# Patient Record
(demographics unavailable — no encounter records)

---

## 2025-03-19 NOTE — DISCUSSION/SUMMARY
[FreeTextEntry1] : sent to [facility] for evaluation parents going home first  short time then will go to ER parent request goint to [facility] call to ER to notify sending to ER for serum total and direct bilirubin due to phototherapy transcutaneous , bilirubin not done in office discussed with parents, weight loss, cardiac murmur and increased crying lost 10 percent of weight loss, history Omar positive ABO incompatibility , s/p phototherapy at 17 hours old Parents have observed crying at home since last note frequently and at times fast breathing on exam has cardiac murmur call back by office to check status parents given copy of  nursery notes and note written for today's visit to bring to hospital ER

## 2025-03-19 NOTE — PHYSICAL EXAM
[TextEntry] : General Appearance:  Awake,  Alert  In no acute distress baby is crying in office, I am able to console, crying with parents active Head:  Appearance:  Anterior fontanelle open and flat Neck:  supple. jaundice to thigs  erythema toxicum. congenital dermal melanocytosis sclera icteric Ears: bilateral  Tympanic Membrane:  Pearly with light reflex bilaterally. Pharynx:Normal findings  non erythematous pharynx Lungs:  Clear to auscultation. Cardiovascular: Heart Rate And Rhythm:  Regular. Heart Sounds:  Normal. Murmurs:  2/6 neyda Abdomen: Palpation:  Soft.  Liver:  Not enlarged. Spleen:  Not enlarged. Cord attached no redness  Genitalia: Abundio 1 testes descended bilateral , no herniacircumcised healing  Musculoskeletal System: General/bilateral:  Normal movement of all extremities.   Normal spine and back.  Normal spine and back. Hips: General/bilateral:  An Ortolani test of the hips was negative.   Hercules's test of the hips was negative Neurological:Motor:  Normal muscle tone.

## 2025-03-19 NOTE — DISCUSSION/SUMMARY
[FreeTextEntry1] : sent to [facility] for evaluation parents going home first  short time then will go to ER parent request goint to [facility] call to ER to notify sending to ER for serum total and direct bilirubin due to phototherapy transcutaneous , bilirubin not done in office discussed with parents, weight loss, cardiac murmur and increased crying lost 10 percent of weight loss, history Omar positive ABO incompatibility , s/p phototherapy at 17 hours old Parents have observed crying at home since last note frequently and at times fast breathing on exam has cardiac murmur call back by office to check status parents given copy of  nursery notes and note written for today's visit to bring to hospital ER

## 2025-03-19 NOTE — HISTORY OF PRESENT ILLNESS
[Born at ___ Wks Gestation] : The patient was born at [unfilled] weeks gestation [] : via normal spontaneous vaginal delivery [[place]] : [facility] [BW: _____] : weight of [unfilled] [Length: _____] : length of [unfilled] [HC: _____] : head circumference of [unfilled] [Hepatitis B Vaccine Given] : Hepatitis B vaccine given [DW: _____] : Discharge weight was [unfilled] [Nirsevimab Given] : Nirsevimab not given [NO] : No [FreeTextEntry1] : MUKESH  is here for  well visit 4 days old, parents concerned as crying since last night and looks yellow jaundice history of phototherapy in  nursery   Nutrition:BF, milk in per  mom Elimination: 3 wet diapers in one day, bm 1 to 2 dark meconium Immunizations: Up to date.   Parent(s) have current concerns or issues. crying since last night looks more yellow, history phototherapy reviewed note 17 h DOL, Coomb's positive red spot palate noticed with crying fast breathing intermittent per dad rash on body  FROM EMR  NURSERY Male infant born at 39+6 weeks to a 32 year old  mother via Vaginal delivery. Maternal history Anemia. Pregnancy course Oligohydramnios. Maternal blood type O+. GBS negative, HBsAg negative, HIV negative; treponema non-reactive & Rubella immune. Baby  B positive Delivery uncomplicated. APGAR 9 & 9 at 1 & 5 minutes respectively. Birth weight 3650 g. Erythromycin eye drops and vitamin K given. Infant blood type B+, Stone positive. EOS score 0.11.  Hospital course was remarkable for hyperbilirubinemia from ABO incompatibility treated with phototherapy. at 17 hours old bilirubin 7.8.  G6PD no deficiency    discharge Weight (ounces): 7 lb 10.929 % weight change = -4.52%   Discharge Laboratory Results Serum bilirubin: 9.1mg/dl at 43 hours of life (phototherapy threshold of 13.3) passed hearing passed [facility]

## 2025-03-19 NOTE — HISTORY OF PRESENT ILLNESS
[Born at ___ Wks Gestation] : The patient was born at [unfilled] weeks gestation [] : via normal spontaneous vaginal delivery [[place]] : [facility] [BW: _____] : weight of [unfilled] [Length: _____] : length of [unfilled] [HC: _____] : head circumference of [unfilled] [Hepatitis B Vaccine Given] : Hepatitis B vaccine given [DW: _____] : Discharge weight was [unfilled] [Nirsevimab Given] : Nirsevimab not given [NO] : No [FreeTextEntry1] : MUKESH  is here for  well visit 4 days old, parents concerned as crying since last night and looks yellow jaundice history of phototherapy in  nursery   Nutrition:BF, milk in per  mom Elimination: 3 wet diapers in one day, bm 1 to 2 dark meconium Immunizations: Up to date.   Parent(s) have current concerns or issues. crying since last night looks more yellow, history phototherapy reviewed note 17 h DOL, Coomb's positive red spot palate noticed with crying fast breathing intermittent per dad rash on body  FROM EMR  NURSERY Male infant born at 39+6 weeks to a 32 year old  mother via Vaginal delivery. Maternal history Anemia. Pregnancy course Oligohydramnios. Maternal blood type O+. GBS negative, HBsAg negative, HIV negative; treponema non-reactive & Rubella immune. Baby  B positive Delivery uncomplicated. APGAR 9 & 9 at 1 & 5 minutes respectively. Birth weight 3650 g. Erythromycin eye drops and vitamin K given. Infant blood type B+, Stone positive. EOS score 0.11.  Hospital course was remarkable for hyperbilirubinemia from ABO incompatibility treated with phototherapy. at 17 hours old bilirubin 7.8.  G6PD no deficiency    discharge Weight (ounces): 7 lb 10.929 % weight change = -4.52%   Discharge Laboratory Results Serum bilirubin: 9.1mg/dl at 43 hours of life (phototherapy threshold of 13.3) passed hearing passed [facility]

## 2025-03-25 NOTE — HISTORY OF PRESENT ILLNESS
[de-identified] : [facility] follow up  [FreeTextEntry6] : MUKESH is  11 days old  FT, here today for follow up [facility] hospitalization 3/18 to 3/21/25 for jaundice, Stone positive ABO incompatibility, weight loss 10% birth weight,  VSD , history of hypothermia  during admission doing well since discharge temperature has been stable Nutrition: breast feeding every 2 to 3 hours and Similac formula about 4 to 5 oz per day Elimination: Normal urination at least 6 per day and bowel movements 4 to 5 /day not meconium   Patient is doing well at home. Feeding well no difficulty feeding, no fast breathing   History seen at  4 days old at our office with crying since the night prior to visit, jaundice, abo incompatibility, Stone positive, meconium stools, breast feeding and lost 10% birth weight loss. History significant for phototherapy at 17 hours old in nursery and discharged home. On exam had cardiac murmur not present in nursery. cord G6PD no deficiency . Sent to [facility] for evaluation. Patient was admitted Birth History:   Delivery: The patient was born at 40 weeks gestation, via normal spontaneous vaginal delivery [facility]. Birth measurements were weight of 8LB 0.79 oz, length of 20.5IN and head circumference of 14.3IN . Discharge weight was 7LB 10.9 oz. Male infant born at 39+6 weeks to a 32 year old  mother via Vaginal delivery. Maternal history Anemia. Pregnancy course Oligohydramnios. Maternal blood type O+. GBS negative, HBsAg negative, HIV negative; treponema non-reactive & Rubella immune. Baby B positive Delivery uncomplicated. APGAR 9 & 9 at 1 & 5 minutes respectively. Birth weight 3650 g. Erythromycin eye drops and vitamin K given. Infant blood type B+, Stone positive. EOS score 0.11.  Hospital course was remarkable for hyperbilirubinemia from ABO incompatibility treated with phototherapy. at 17 hours old    On admission bilirubin was 19.8/direct 0.8, sodium 152, given bolus. discharge bilirubin 3/21/2025 was 12.6 direct 0.2. History lactation consultation, started on vitamin d and formula supplementation. On HD2 baby was hypothermic 35.6 rule out sepsis work was done. given empiric amp and gentamycin for 48 hours. RVP PCR negative cbc, crp procalcitonin done, cxr was negative. cardiac evaluated echo small to moderate sized donna membranous VSD with partly restrictive left to right flow, mildly elevated RV pressure and PFO

## 2025-03-25 NOTE — PHYSICAL EXAM
[TextEntry] :  General Appearance:  Awake,  Alert  In no acute distress well appearing good cry alert, consoles in office with parents Head:  Appearance:  Anterior fontanelle open and flat Neck:  supple. jaundice much improved from last visit s/p phototherapy Ears: bilateral  Tympanic Membrane:  Pearly with light reflex bilaterally. Pharynx:Normal findings  non erythematous pharynx Lungs:  Clear to auscultation. Cardiovascular: Heart Rate And Rhythm:  Regular. Heart Sounds:  Normal. Murmurs:  2/6 loud LSB murmurs were heard. Abdomen: Palpation:  Soft.  Liver:  Not enlarged. Spleen:  Not enlarged. cord attached no redness  Genitalia: Abundio 1 testes descended bilateral , no hernia healing circumcision bilateral hydroceles Hips: General/bilateral:  An Ortolani test of the hips was negative.   Hercules's test of the hips was negative Neurological:Motor:  Normal muscle tone. congenital dermal melanocytosis

## 2025-03-25 NOTE — DISCUSSION/SUMMARY
[FreeTextEntry1] : great weight gain  since discharge 17 oz in 7 days temperature stable refer cardiology outpatient has follow up scheduled appointment due to phototherapy x2, sent for total and direct bilirubin stat  for tomorrow baby is now 11 days old, well hydrated, feeding well   parents would like to go to [facility] will go tomorrow morning gave parents contact number as may need appt, advised to in am ( outpatient lab in hospital) parents will ask at lab re length of time for results and will call our office for results call back for nurse left if bilirubin is within normal limits for age will be 12 days old can follow up in one week or if concerns re bilirubin level parents aware may need earlier appt observe hydroceles given vitamin d infant one drop per discharge (400 units per drop) reviewed hospital admission notes

## 2025-04-11 NOTE — DISCUSSION/SUMMARY
[FreeTextEntry1] : increase bathing to 3 every 3 to 4 days,  for example cetaphil cleanser, aquaphor baby take hat off for sleep keep cool  for scalp try test patch Vaseline, if increases hydrocortisone 2.5% bid sparingly thin layer do not apply to groin/eyelids, to body cerave, cetaphil not baby or Vaseline try small amount first baby acne face, papules body scalp, cry turnk re  heat rash no excoriation serum blood test  not ordered stat given as 28 days old if indirect bilirubin elevated and direct normal would be consistent with breast feeding jaundice follow up at one month well visit

## 2025-04-11 NOTE — PHYSICAL EXAM
[TextEntry] :  General Appearance:  Awake,  Alert  In no acute distress Head:  Appearance:  Anterior fontanelle open and flat Neck:  supple. Eyes:   Pupils:  PERRL Ears: bilateral  Tympanic Membrane:  Pearly with light reflex bilaterally. Pharynx:Normal findings  non erythematous pharynx Lungs:  Clear to auscultation. Cardiovascular: Heart Rate And Rhythm:  Regular. Heart Sounds:  Normal. Murmurs:  2/6 loud murmur were heard. Abdomen: Palpation:  Soft.  Liver:  Not enlarged. Spleen:  Not enlarged.  Genitalia: Abundio 1 testes descended bilateral , no hernia Hips: General/bilateral:  An Ortolani test of the hips was negative.   Hercules's test of the hips was negative Neurological:Motor:  Normal muscle tone. facial jaundice sclera icteric small flat redness philtrum, papular rash scalp, baby acne face some scattered papules not Linears  trunk, arms legs no pustules dry, in axilla some vernix

## 2025-04-11 NOTE — HISTORY OF PRESENT ILLNESS
[de-identified] : red bumps started on face, spread to top of head, then to chest x 1 week [FreeTextEntry6] : MUKESH  is here today for a history of rash for one week increased on scalp and face on body mom thought may be heat rash, took hat , layers off and felt cold breast feeding well history of jaundice Stone positive , abo incompatibility had phototherapy in nursery and 3/18 bilirubin 3/27 10.4 direct serum 0.3 concerns as continues to be jaundice has cardiology appt tomorrow follow up VSD